# Patient Record
Sex: FEMALE | Race: WHITE | NOT HISPANIC OR LATINO | Employment: FULL TIME | URBAN - METROPOLITAN AREA
[De-identification: names, ages, dates, MRNs, and addresses within clinical notes are randomized per-mention and may not be internally consistent; named-entity substitution may affect disease eponyms.]

---

## 2024-01-08 ENCOUNTER — OFFICE VISIT (OUTPATIENT)
Dept: PRIMARY CARE | Facility: CLINIC | Age: 39
End: 2024-01-08
Payer: COMMERCIAL

## 2024-01-08 VITALS
RESPIRATION RATE: 15 BRPM | HEART RATE: 98 BPM | TEMPERATURE: 98 F | HEIGHT: 67 IN | DIASTOLIC BLOOD PRESSURE: 74 MMHG | SYSTOLIC BLOOD PRESSURE: 122 MMHG | WEIGHT: 171.2 LBS | BODY MASS INDEX: 26.87 KG/M2 | OXYGEN SATURATION: 99 %

## 2024-01-08 DIAGNOSIS — Z00.00 ANNUAL PHYSICAL EXAM: Primary | ICD-10-CM

## 2024-01-08 DIAGNOSIS — F41.9 ANXIETY AND DEPRESSION: ICD-10-CM

## 2024-01-08 DIAGNOSIS — F32.A ANXIETY AND DEPRESSION: ICD-10-CM

## 2024-01-08 DIAGNOSIS — B36.0 TINEA VERSICOLOR: ICD-10-CM

## 2024-01-08 PROCEDURE — 1036F TOBACCO NON-USER: CPT | Performed by: FAMILY MEDICINE

## 2024-01-08 PROCEDURE — 99213 OFFICE O/P EST LOW 20 MIN: CPT | Performed by: FAMILY MEDICINE

## 2024-01-08 PROCEDURE — 99395 PREV VISIT EST AGE 18-39: CPT | Performed by: FAMILY MEDICINE

## 2024-01-08 RX ORDER — DULOXETIN HYDROCHLORIDE 60 MG/1
60 CAPSULE, DELAYED RELEASE ORAL DAILY
Qty: 90 CAPSULE | Refills: 3 | Status: SHIPPED | OUTPATIENT
Start: 2024-01-08

## 2024-01-08 RX ORDER — SELENIUM SULFIDE 2.5 MG/100ML
LOTION TOPICAL
Qty: 120 ML | Refills: 2 | Status: SHIPPED | OUTPATIENT
Start: 2024-01-08 | End: 2025-01-07

## 2024-01-08 RX ORDER — TIRZEPATIDE 10 MG/.5ML
10 INJECTION, SOLUTION SUBCUTANEOUS
Qty: 2 ML | Refills: 0
Start: 2024-01-08 | End: 2024-02-07

## 2024-01-08 RX ORDER — DULOXETIN HYDROCHLORIDE 60 MG/1
60 CAPSULE, DELAYED RELEASE ORAL DAILY
COMMUNITY
End: 2024-01-08 | Stop reason: SDUPTHER

## 2024-01-08 ASSESSMENT — ANXIETY QUESTIONNAIRES
4. TROUBLE RELAXING: NOT AT ALL
2. NOT BEING ABLE TO STOP OR CONTROL WORRYING: SEVERAL DAYS
5. BEING SO RESTLESS THAT IT IS HARD TO SIT STILL: SEVERAL DAYS
IF YOU CHECKED OFF ANY PROBLEMS ON THIS QUESTIONNAIRE, HOW DIFFICULT HAVE THESE PROBLEMS MADE IT FOR YOU TO DO YOUR WORK, TAKE CARE OF THINGS AT HOME, OR GET ALONG WITH OTHER PEOPLE: NOT DIFFICULT AT ALL
1. FEELING NERVOUS, ANXIOUS, OR ON EDGE: NOT AT ALL
GAD7 TOTAL SCORE: 3
6. BECOMING EASILY ANNOYED OR IRRITABLE: NOT AT ALL
7. FEELING AFRAID AS IF SOMETHING AWFUL MIGHT HAPPEN: NOT AT ALL
3. WORRYING TOO MUCH ABOUT DIFFERENT THINGS: SEVERAL DAYS

## 2024-01-08 ASSESSMENT — PATIENT HEALTH QUESTIONNAIRE - PHQ9
6. FEELING BAD ABOUT YOURSELF - OR THAT YOU ARE A FAILURE OR HAVE LET YOURSELF OR YOUR FAMILY DOWN: NOT AT ALL
1. LITTLE INTEREST OR PLEASURE IN DOING THINGS: NOT AT ALL
9. THOUGHTS THAT YOU WOULD BE BETTER OFF DEAD, OR OF HURTING YOURSELF: NOT AT ALL
7. TROUBLE CONCENTRATING ON THINGS, SUCH AS READING THE NEWSPAPER OR WATCHING TELEVISION: NOT AT ALL
10. IF YOU CHECKED OFF ANY PROBLEMS, HOW DIFFICULT HAVE THESE PROBLEMS MADE IT FOR YOU TO DO YOUR WORK, TAKE CARE OF THINGS AT HOME, OR GET ALONG WITH OTHER PEOPLE: NOT DIFFICULT AT ALL
5. POOR APPETITE OR OVEREATING: NOT AT ALL
8. MOVING OR SPEAKING SO SLOWLY THAT OTHER PEOPLE COULD HAVE NOTICED. OR THE OPPOSITE, BEING SO FIGETY OR RESTLESS THAT YOU HAVE BEEN MOVING AROUND A LOT MORE THAN USUAL: NOT AT ALL
SUM OF ALL RESPONSES TO PHQ9 QUESTIONS 1 AND 2: 0
2. FEELING DOWN, DEPRESSED OR HOPELESS: NOT AT ALL
4. FEELING TIRED OR HAVING LITTLE ENERGY: SEVERAL DAYS
SUM OF ALL RESPONSES TO PHQ QUESTIONS 1-9: 1
3. TROUBLE FALLING OR STAYING ASLEEP OR SLEEPING TOO MUCH: NOT AT ALL

## 2024-01-08 NOTE — PROGRESS NOTES
"Subjective   Patient ID: Edison Horan is a 38 y.o. female who presents for Annual Exam.    Here for CPE    Taking cymbalta and helps anxiety and depression  Stable on meds  Sexual side effects.    Sleeping well  Diet is good  Taking GLP1 semaglutide through compounding pharm.   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   Constitutional:       Appearance: Normal appearance.   HENT:      Head: Normocephalic.      Right Ear: Tympanic membrane normal.      Left Ear: Tympanic membrane normal.      Nose: Nose normal.      Mouth/Throat:      Mouth: Mucous membranes are dry.   Eyes:      Extraocular Movements: Extraocular movements intact.      Pupils: Pupils are equal, round, and reactive to light.   Cardiovascular:      Rate and Rhythm: Normal rate and regular rhythm.      Pulses: Normal pulses.   Pulmonary:      Effort: Pulmonary effort is normal.      Breath sounds: Normal breath sounds.   Abdominal:      General: Abdomen is flat. Bowel sounds are normal.      Palpations: Abdomen is soft.   Musculoskeletal:         General: Normal range of motion.      Cervical back: Normal range of motion.   Skin:     General: Skin is warm and dry.   Neurological:      General: No focal deficit present.      Mental Status: She is alert and oriented to person, place, and time.   Psychiatric:         Mood and Affect: Mood normal.         Behavior: Behavior normal.         Thought Content: Thought content normal.         Judgment: Judgment normal.       Assessment/Plan   Problem List Items Addressed This Visit  "            ICD-10-CM    Annual physical exam - Primary Z00.00     Schedule fasting labs  Increase physical activity  Continue diet changes  Recheck in 1 year         Relevant Medications    tirzepatide (Mounjaro) 10 mg/0.5 mL pen injector    Other Relevant Orders    CBC and Auto Differential    Comprehensive Metabolic Panel    Lipid Panel    Tinea versicolor B36.0     Trial of selenium sulfide soln  Apply once daily for 10-14 days  Return if not improving.         Relevant Medications    selenium sulfide (Selsun) 2.5 % shampoo    Anxiety and depression F41.9, F32.A     Stable  Refilling meds at the same dose  Recheck in 1 year         Relevant Medications    DULoxetine (Cymbalta) 60 mg DR capsule

## 2024-01-22 ENCOUNTER — LAB (OUTPATIENT)
Dept: LAB | Facility: LAB | Age: 39
End: 2024-01-22
Payer: COMMERCIAL

## 2024-01-22 DIAGNOSIS — Z00.00 ANNUAL PHYSICAL EXAM: ICD-10-CM

## 2024-01-22 LAB
ALBUMIN SERPL BCP-MCNC: 4.1 G/DL (ref 3.4–5)
ALP SERPL-CCNC: 47 U/L (ref 33–110)
ALT SERPL W P-5'-P-CCNC: 15 U/L (ref 7–45)
ANION GAP SERPL CALC-SCNC: 12 MMOL/L (ref 10–20)
AST SERPL W P-5'-P-CCNC: 14 U/L (ref 9–39)
BASOPHILS # BLD AUTO: 0.04 X10*3/UL (ref 0–0.1)
BASOPHILS NFR BLD AUTO: 0.9 %
BILIRUB SERPL-MCNC: 0.9 MG/DL (ref 0–1.2)
BUN SERPL-MCNC: 8 MG/DL (ref 6–23)
CALCIUM SERPL-MCNC: 9.2 MG/DL (ref 8.6–10.6)
CHLORIDE SERPL-SCNC: 105 MMOL/L (ref 98–107)
CHOLEST SERPL-MCNC: 172 MG/DL (ref 0–199)
CHOLESTEROL/HDL RATIO: 2.3
CO2 SERPL-SCNC: 26 MMOL/L (ref 21–32)
CREAT SERPL-MCNC: 0.66 MG/DL (ref 0.5–1.05)
EGFRCR SERPLBLD CKD-EPI 2021: >90 ML/MIN/1.73M*2
EOSINOPHIL # BLD AUTO: 0.09 X10*3/UL (ref 0–0.7)
EOSINOPHIL NFR BLD AUTO: 2.1 %
ERYTHROCYTE [DISTWIDTH] IN BLOOD BY AUTOMATED COUNT: 11.9 % (ref 11.5–14.5)
GLUCOSE SERPL-MCNC: 73 MG/DL (ref 74–99)
HCT VFR BLD AUTO: 40.4 % (ref 36–46)
HDLC SERPL-MCNC: 74.3 MG/DL
HGB BLD-MCNC: 12.5 G/DL (ref 12–16)
IMM GRANULOCYTES # BLD AUTO: 0.01 X10*3/UL (ref 0–0.7)
IMM GRANULOCYTES NFR BLD AUTO: 0.2 % (ref 0–0.9)
LDLC SERPL CALC-MCNC: 89 MG/DL
LYMPHOCYTES # BLD AUTO: 1.73 X10*3/UL (ref 1.2–4.8)
LYMPHOCYTES NFR BLD AUTO: 40.2 %
MCH RBC QN AUTO: 26.5 PG (ref 26–34)
MCHC RBC AUTO-ENTMCNC: 30.9 G/DL (ref 32–36)
MCV RBC AUTO: 86 FL (ref 80–100)
MONOCYTES # BLD AUTO: 0.23 X10*3/UL (ref 0.1–1)
MONOCYTES NFR BLD AUTO: 5.3 %
NEUTROPHILS # BLD AUTO: 2.2 X10*3/UL (ref 1.2–7.7)
NEUTROPHILS NFR BLD AUTO: 51.3 %
NON HDL CHOLESTEROL: 98 MG/DL (ref 0–149)
NRBC BLD-RTO: 0 /100 WBCS (ref 0–0)
PLATELET # BLD AUTO: 240 X10*3/UL (ref 150–450)
POTASSIUM SERPL-SCNC: 4 MMOL/L (ref 3.5–5.3)
PROT SERPL-MCNC: 6.7 G/DL (ref 6.4–8.2)
RBC # BLD AUTO: 4.71 X10*6/UL (ref 4–5.2)
SODIUM SERPL-SCNC: 139 MMOL/L (ref 136–145)
TRIGL SERPL-MCNC: 46 MG/DL (ref 0–149)
VLDL: 9 MG/DL (ref 0–40)
WBC # BLD AUTO: 4.3 X10*3/UL (ref 4.4–11.3)

## 2024-01-22 PROCEDURE — 80061 LIPID PANEL: CPT

## 2024-01-22 PROCEDURE — 85025 COMPLETE CBC W/AUTO DIFF WBC: CPT

## 2024-01-22 PROCEDURE — 80053 COMPREHEN METABOLIC PANEL: CPT

## 2024-01-22 PROCEDURE — 36415 COLL VENOUS BLD VENIPUNCTURE: CPT

## 2024-02-07 ENCOUNTER — APPOINTMENT (OUTPATIENT)
Dept: PRIMARY CARE | Facility: CLINIC | Age: 39
End: 2024-02-07
Payer: COMMERCIAL

## 2024-04-08 ENCOUNTER — APPOINTMENT (OUTPATIENT)
Dept: PRIMARY CARE | Facility: CLINIC | Age: 39
End: 2024-04-08
Payer: COMMERCIAL

## 2024-04-18 ENCOUNTER — OFFICE VISIT (OUTPATIENT)
Dept: PRIMARY CARE | Facility: CLINIC | Age: 39
End: 2024-04-18
Payer: COMMERCIAL

## 2024-04-18 ENCOUNTER — APPOINTMENT (OUTPATIENT)
Dept: PRIMARY CARE | Facility: CLINIC | Age: 39
End: 2024-04-18
Payer: COMMERCIAL

## 2024-04-18 ENCOUNTER — LAB (OUTPATIENT)
Dept: LAB | Facility: LAB | Age: 39
End: 2024-04-18
Payer: COMMERCIAL

## 2024-04-18 VITALS
RESPIRATION RATE: 16 BRPM | WEIGHT: 167 LBS | HEART RATE: 87 BPM | TEMPERATURE: 98 F | SYSTOLIC BLOOD PRESSURE: 118 MMHG | OXYGEN SATURATION: 98 % | DIASTOLIC BLOOD PRESSURE: 83 MMHG | HEIGHT: 67 IN | BODY MASS INDEX: 26.21 KG/M2

## 2024-04-18 DIAGNOSIS — R55 SYNCOPE, UNSPECIFIED SYNCOPE TYPE: ICD-10-CM

## 2024-04-18 DIAGNOSIS — R42 ORTHOSTATIC DIZZINESS: ICD-10-CM

## 2024-04-18 DIAGNOSIS — R42 ORTHOSTATIC DIZZINESS: Primary | ICD-10-CM

## 2024-04-18 LAB
ANION GAP SERPL CALC-SCNC: 12 MMOL/L (ref 10–20)
BUN SERPL-MCNC: 6 MG/DL (ref 6–23)
CALCIUM SERPL-MCNC: 9.5 MG/DL (ref 8.6–10.6)
CHLORIDE SERPL-SCNC: 106 MMOL/L (ref 98–107)
CO2 SERPL-SCNC: 27 MMOL/L (ref 21–32)
CREAT SERPL-MCNC: 0.56 MG/DL (ref 0.5–1.05)
EGFRCR SERPLBLD CKD-EPI 2021: >90 ML/MIN/1.73M*2
ERYTHROCYTE [DISTWIDTH] IN BLOOD BY AUTOMATED COUNT: 12.4 % (ref 11.5–14.5)
GLUCOSE SERPL-MCNC: 78 MG/DL (ref 74–99)
HCT VFR BLD AUTO: 41.6 % (ref 36–46)
HGB BLD-MCNC: 13 G/DL (ref 12–16)
MCH RBC QN AUTO: 26.9 PG (ref 26–34)
MCHC RBC AUTO-ENTMCNC: 31.3 G/DL (ref 32–36)
MCV RBC AUTO: 86 FL (ref 80–100)
NRBC BLD-RTO: 0 /100 WBCS (ref 0–0)
PLATELET # BLD AUTO: 239 X10*3/UL (ref 150–450)
POTASSIUM SERPL-SCNC: 4.5 MMOL/L (ref 3.5–5.3)
RBC # BLD AUTO: 4.83 X10*6/UL (ref 4–5.2)
SODIUM SERPL-SCNC: 140 MMOL/L (ref 136–145)
WBC # BLD AUTO: 4.5 X10*3/UL (ref 4.4–11.3)

## 2024-04-18 PROCEDURE — 85027 COMPLETE CBC AUTOMATED: CPT

## 2024-04-18 PROCEDURE — 36415 COLL VENOUS BLD VENIPUNCTURE: CPT

## 2024-04-18 PROCEDURE — 99214 OFFICE O/P EST MOD 30 MIN: CPT | Performed by: FAMILY MEDICINE

## 2024-04-18 PROCEDURE — 80048 BASIC METABOLIC PNL TOTAL CA: CPT

## 2024-04-18 NOTE — PROGRESS NOTES
"Subjective   Patient ID: Edison Horan is a 38 y.o. female who presents for Discuss fainting  (2x in 3 months).    HPI   PCP: Dr. Baker    Fainted twice in the past 4 months.   1st episode 4 months ago, occurred immediately after standing.  No changes in fluid or food intake prior.  2nd episode 3 wks ago, occurred immediately after standing.  Was fasting during Ramadan.  Episode witnessed by .  Duration < 1 min.  No incontinence or tonic-clonic activity.   Occ gets head de los santos when standing up too fast.  Drinks 35 oz water/day, 1 cup coffee/day, occ tea.  No pop, alcohol.     Review of Systems  No other complaints.     Objective   /83   Pulse 87   Temp 36.7 °C (98 °F)   Resp 16   Ht 1.689 m (5' 6.5\")   Wt 75.8 kg (167 lb)   SpO2 98%   BMI 26.55 kg/m²   Orthostatics: Laying: /64 HR 78, Sitting: /66 HR 86, Standing: /68 .    Physical Exam  Constitutional:       General: She is not in acute distress.     Appearance: She is overweight.   HENT:      Head: Normocephalic.      Right Ear: Tympanic membrane normal.      Left Ear: Tympanic membrane normal.      Mouth/Throat:      Pharynx: Oropharynx is clear. No oropharyngeal exudate or posterior oropharyngeal erythema.   Eyes:      Conjunctiva/sclera: Conjunctivae normal.      Pupils: Pupils are equal, round, and reactive to light.   Neck:      Thyroid: No thyromegaly.      Vascular: No carotid bruit.   Cardiovascular:      Rate and Rhythm: Normal rate and regular rhythm.      Heart sounds: Normal heart sounds. No murmur heard.     No friction rub. No gallop.   Pulmonary:      Effort: Pulmonary effort is normal.      Breath sounds: Normal breath sounds. No wheezing, rhonchi or rales.   Neurological:      Mental Status: She is oriented to person, place, and time.   Psychiatric:         Mood and Affect: Mood normal.         Behavior: Behavior normal.     Assessment/Plan   Diagnoses and all orders for this visit:  Orthostatic dizziness  - "     CBC; Future  -     Basic Metabolic Panel; Future  Syncope, unspecified syncope type  -     CBC; Future  -     Basic Metabolic Panel; Future    Nonfasting labs.  Discussed hypovolemia as possible etiology of orthostatic head rush.  Discussed fasting status and hypovolemia as etiology of prior syncopal episodes.  Recommend increasing water intake (64 oz water/day) and eliminating caffeine intake.  Recommend eating at regular intervals (3 meals/day and 2 between meal snacks/day).  Call, send message through [employer], or return to office prn if these symptoms worsen or fail to improve as anticipated.     F/U w/PCP per normal schedule.

## 2024-04-18 NOTE — PATIENT INSTRUCTIONS
Nonfasting labs.  Discussed hypovolemia as possible etiology of orthostatic head rush.  Discussed fasting status and hypovolemia as etiology of prior syncopal episodes.  Recommend increasing water intake (64 oz water/day) and eliminating caffeine intake.  Recommend eating at regular intervals (3 meals/day and 2 between meal snacks/day).  Call, send message through [employer], or return to office prn if these symptoms worsen or fail to improve as anticipated.     F/U w/PCP per normal schedule.

## 2024-08-15 ENCOUNTER — TELEPHONE (OUTPATIENT)
Dept: DERMATOLOGY | Facility: CLINIC | Age: 39
End: 2024-08-15
Payer: COMMERCIAL

## 2024-12-29 DIAGNOSIS — F32.A ANXIETY AND DEPRESSION: ICD-10-CM

## 2024-12-29 DIAGNOSIS — F41.9 ANXIETY AND DEPRESSION: ICD-10-CM

## 2024-12-30 DIAGNOSIS — F41.9 ANXIETY AND DEPRESSION: ICD-10-CM

## 2024-12-30 DIAGNOSIS — F32.A ANXIETY AND DEPRESSION: ICD-10-CM

## 2024-12-30 RX ORDER — DULOXETIN HYDROCHLORIDE 60 MG/1
60 CAPSULE, DELAYED RELEASE ORAL DAILY
Qty: 90 CAPSULE | Refills: 0 | Status: SHIPPED | OUTPATIENT
Start: 2024-12-30

## 2024-12-30 RX ORDER — DULOXETIN HYDROCHLORIDE 60 MG/1
60 CAPSULE, DELAYED RELEASE ORAL DAILY
Qty: 90 CAPSULE | Refills: 0 | Status: SHIPPED | OUTPATIENT
Start: 2024-12-30 | End: 2024-12-30 | Stop reason: SDUPTHER

## 2025-03-03 ENCOUNTER — APPOINTMENT (OUTPATIENT)
Dept: PRIMARY CARE | Facility: CLINIC | Age: 40
End: 2025-03-03
Payer: COMMERCIAL

## 2025-03-03 VITALS
WEIGHT: 167 LBS | HEIGHT: 67 IN | SYSTOLIC BLOOD PRESSURE: 108 MMHG | BODY MASS INDEX: 26.21 KG/M2 | DIASTOLIC BLOOD PRESSURE: 76 MMHG

## 2025-03-03 DIAGNOSIS — Z80.3 FAMILY HISTORY OF BREAST CANCER: ICD-10-CM

## 2025-03-03 DIAGNOSIS — E55.9 VITAMIN D DEFICIENCY: ICD-10-CM

## 2025-03-03 DIAGNOSIS — E66.3 OVERWEIGHT (BMI 25.0-29.9): ICD-10-CM

## 2025-03-03 DIAGNOSIS — Z00.00 ROUTINE GENERAL MEDICAL EXAMINATION AT A HEALTH CARE FACILITY: Primary | ICD-10-CM

## 2025-03-03 DIAGNOSIS — F32.A ANXIETY AND DEPRESSION: ICD-10-CM

## 2025-03-03 DIAGNOSIS — F41.9 ANXIETY AND DEPRESSION: ICD-10-CM

## 2025-03-03 DIAGNOSIS — R00.2 PALPITATIONS: ICD-10-CM

## 2025-03-03 DIAGNOSIS — Z98.84 S/P GASTRIC BYPASS: ICD-10-CM

## 2025-03-03 PROBLEM — R76.8 ELEVATED ANTI-TISSUE TRANSGLUTAMINASE (TTG) IGA LEVEL: Status: ACTIVE | Noted: 2025-03-03

## 2025-03-03 PROBLEM — L30.9 ECZEMA: Status: ACTIVE | Noted: 2025-03-03

## 2025-03-03 PROBLEM — K80.20 CHOLELITHIASIS: Status: ACTIVE | Noted: 2025-03-03

## 2025-03-03 PROBLEM — J30.9 ALLERGIC RHINITIS: Status: RESOLVED | Noted: 2025-03-03 | Resolved: 2025-03-03

## 2025-03-03 PROBLEM — Z97.5 DISORDER OF INTRAUTERINE CONTRACEPTIVE DEVICE (CMS-HCC): Status: ACTIVE | Noted: 2025-03-03

## 2025-03-03 PROBLEM — B36.0 TINEA VERSICOLOR: Status: RESOLVED | Noted: 2024-01-08 | Resolved: 2025-03-03

## 2025-03-03 PROBLEM — H92.09 REFERRED OTALGIA: Status: RESOLVED | Noted: 2019-11-06 | Resolved: 2025-03-03

## 2025-03-03 PROBLEM — D64.9 ANEMIA: Status: RESOLVED | Noted: 2025-03-03 | Resolved: 2025-03-03

## 2025-03-03 PROBLEM — M25.562 ARTHRALGIA OF BOTH KNEES: Status: ACTIVE | Noted: 2025-03-03

## 2025-03-03 PROBLEM — R76.11 POSITIVE TB TEST: Status: ACTIVE | Noted: 2025-03-03

## 2025-03-03 PROBLEM — H60.549 ECZEMA OF EXTERNAL AUDITORY CANAL: Status: ACTIVE | Noted: 2019-11-06

## 2025-03-03 PROBLEM — R63.4 RAPID WEIGHT LOSS: Status: RESOLVED | Noted: 2025-03-03 | Resolved: 2025-03-03

## 2025-03-03 PROBLEM — R53.83 FATIGUE: Status: ACTIVE | Noted: 2025-03-03

## 2025-03-03 PROBLEM — R10.812 LEFT UPPER QUADRANT ABDOMINAL TENDERNESS: Status: RESOLVED | Noted: 2025-03-03 | Resolved: 2025-03-03

## 2025-03-03 PROBLEM — H69.91 DYSFUNCTION OF RIGHT EUSTACHIAN TUBE: Status: RESOLVED | Noted: 2019-11-06 | Resolved: 2025-03-03

## 2025-03-03 PROBLEM — T83.9XXA DISORDER OF INTRAUTERINE CONTRACEPTIVE DEVICE (CMS-HCC): Status: ACTIVE | Noted: 2025-03-03

## 2025-03-03 PROBLEM — H60.509 OTITIS EXTERNA, ACUTE: Status: RESOLVED | Noted: 2025-03-03 | Resolved: 2025-03-03

## 2025-03-03 PROBLEM — M79.7 FIBROMYALGIA: Status: ACTIVE | Noted: 2025-03-03

## 2025-03-03 PROBLEM — N63.11 BREAST LUMP ON RIGHT SIDE AT 11 O'CLOCK POSITION: Status: ACTIVE | Noted: 2025-03-03

## 2025-03-03 PROBLEM — M26.629 TEMPOROMANDIBULAR JOINT-PAIN-DYSFUNCTION SYNDROME: Status: ACTIVE | Noted: 2019-11-06

## 2025-03-03 PROBLEM — K90.9 MALABSORPTION (HHS-HCC): Status: RESOLVED | Noted: 2025-03-03 | Resolved: 2025-03-03

## 2025-03-03 PROBLEM — R59.9 LYMPH NODES ENLARGED: Status: RESOLVED | Noted: 2025-03-03 | Resolved: 2025-03-03

## 2025-03-03 PROBLEM — H61.23 BILATERAL IMPACTED CERUMEN: Status: RESOLVED | Noted: 2025-03-03 | Resolved: 2025-03-03

## 2025-03-03 PROBLEM — K21.9 GERD (GASTROESOPHAGEAL REFLUX DISEASE): Status: ACTIVE | Noted: 2025-03-03

## 2025-03-03 PROBLEM — M25.561 ARTHRALGIA OF BOTH KNEES: Status: ACTIVE | Noted: 2025-03-03

## 2025-03-03 PROBLEM — L68.0 HIRSUTISM: Status: ACTIVE | Noted: 2025-03-03

## 2025-03-03 PROBLEM — M70.70 BURSITIS OF HIP: Status: RESOLVED | Noted: 2025-03-03 | Resolved: 2025-03-03

## 2025-03-03 PROBLEM — J02.9 SORETHROAT: Status: RESOLVED | Noted: 2025-03-03 | Resolved: 2025-03-03

## 2025-03-03 PROBLEM — F33.0 DEPRESSION, MAJOR, RECURRENT, MILD (CMS-HCC): Status: ACTIVE | Noted: 2025-03-03

## 2025-03-03 PROCEDURE — 99395 PREV VISIT EST AGE 18-39: CPT | Performed by: INTERNAL MEDICINE

## 2025-03-03 PROCEDURE — 1036F TOBACCO NON-USER: CPT | Performed by: INTERNAL MEDICINE

## 2025-03-03 PROCEDURE — 3008F BODY MASS INDEX DOCD: CPT | Performed by: INTERNAL MEDICINE

## 2025-03-03 RX ORDER — BISMUTH SUBSALICYLATE 262 MG
1 TABLET,CHEWABLE ORAL DAILY
COMMUNITY

## 2025-03-03 RX ORDER — DULOXETIN HYDROCHLORIDE 60 MG/1
60 CAPSULE, DELAYED RELEASE ORAL DAILY
Qty: 90 CAPSULE | Refills: 3 | Status: SHIPPED | OUTPATIENT
Start: 2025-03-03

## 2025-03-03 RX ORDER — ERGOCALCIFEROL 1.25 MG/1
CAPSULE ORAL
COMMUNITY
End: 2025-03-03 | Stop reason: ALTCHOICE

## 2025-03-03 RX ORDER — FERROUS SULFATE 325(65) MG
325 TABLET ORAL
COMMUNITY

## 2025-03-03 ASSESSMENT — PATIENT HEALTH QUESTIONNAIRE - PHQ9
SUM OF ALL RESPONSES TO PHQ9 QUESTIONS 1 AND 2: 0
1. LITTLE INTEREST OR PLEASURE IN DOING THINGS: NOT AT ALL
2. FEELING DOWN, DEPRESSED OR HOPELESS: NOT AT ALL

## 2025-03-03 NOTE — PROGRESS NOTES
"Subjective   Patient ID: Edison Horan is a 39 y.o. female who presents for Annual Exam.    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  She is due for an annual wellness exam and lab work.  Patient is scheduled to see gynecology regarding Pap/pelvic.  She does have family history of breast cancer in her maternal aunt and did have a mammogram done in May 2019 which was okay.  I have advised patient to discuss with gynecology regarding getting a repeat mammogram if she is concerned about this due to family history.  Patient lives with her  and young kids.  She is getting semaglutide through a compounding pharmacy to keep her weight down.  She is status post gastric bypass and claims that the duloxetine she takes for anxiety/depression has caused some weight gain.  Mood is stable on current dose of duloxetine for which patient request refills.  No history of fever, chills, chest pain, shortness of breath, cough, dizziness, syncope, abdominal pain, nausea, vomiting, diarrhea, melena, rectal bleeding, dysuria, hematuria, headaches, weakness, numbness, mood or sleep issues reported.  No vision or hearing issues reported.  Review of Systems  Patient has an IUD in place and she does get some spotting on a monthly basis.  Patient has rare episodes of palpitations which resolved with Valsalva maneuver but she has no other associated cardiac symptoms.  Objective   /76 (BP Location: Right arm, Patient Position: Sitting, BP Cuff Size: Large adult)   Ht 1.689 m (5' 6.5\")   Wt 75.8 kg (167 lb)   BMI 26.55 kg/m²     Physical Exam  General - well developed, well appearing, slightly overweight, young female in no acute respiratory distress  Eyes - normal conjunctiva with no pallor or icterus, normal extraocular movements  ENT - normal external auditory canals and tympanic membranes, throat clear with no exudates  Neck - No JVD, thyromegaly or " lymphadenopathy  Lungs - no respiratory distress and lungs clear to auscultation bilaterally with no rales or wheezes  Heart - normal S1, S2 with normal heart rate, rhythm and no murmurs   Breasts, pelvic and pap - per gyn  Abdomen -  soft, nontender with no masses or organomegaly  Extremities - no cyanosis or pedal edema  Neuro - grossly normal neuro exam with no focal neuro deficits  Psych - normal mental status, mood and affect   Skin - no rashes or ulcers  MSK - normal gait with grossly normal ROM of major joints  Assessment/Plan     1.  Annual wellness exam-Pap/pelvic/mammogram per gynecology, routine labs will be ordered  2.  Anxiety and depression-stable, refill provided for duloxetine  3.  Vitamin D deficiency-vitamin D 25-hydroxy level will be checked  4.  Status post gastric bypass, overweight with a BMI of over 25-patient is getting semaglutide through a compounding pharmacy and this is keeping her weight stable  5.  Palpitations, rare-monitor for now, TSH will be ordered  6.  Family history of breast cancer-patient has been advised to check with gynecology regarding getting mammogram done now since the last one was done in 2019  Follow-up in 1 year or sooner if needed.  This note was partially generated using the Dragon voice recognition system. There may be some incorrect words, spelling and punctuation errors that were not corrected prior to committing the note to the patient's medical record.

## 2025-03-04 DIAGNOSIS — E55.9 VITAMIN D DEFICIENCY: Primary | ICD-10-CM

## 2025-03-04 LAB
25(OH)D3+25(OH)D2 SERPL-MCNC: 14 NG/ML (ref 30–100)
ALBUMIN SERPL-MCNC: 4.7 G/DL (ref 3.6–5.1)
ALP SERPL-CCNC: 52 U/L (ref 31–125)
ALT SERPL-CCNC: 16 U/L (ref 6–29)
ANION GAP SERPL CALCULATED.4IONS-SCNC: 10 MMOL/L (CALC) (ref 7–17)
APPEARANCE UR: ABNORMAL
AST SERPL-CCNC: 17 U/L (ref 10–30)
BACTERIA #/AREA URNS HPF: ABNORMAL /HPF
BILIRUB SERPL-MCNC: 0.5 MG/DL (ref 0.2–1.2)
BILIRUB UR QL STRIP: NEGATIVE
BUN SERPL-MCNC: 13 MG/DL (ref 7–25)
CALCIUM SERPL-MCNC: 9.5 MG/DL (ref 8.6–10.2)
CHLORIDE SERPL-SCNC: 102 MMOL/L (ref 98–110)
CHOLEST SERPL-MCNC: 215 MG/DL
CHOLEST/HDLC SERPL: 2.5 (CALC)
CO2 SERPL-SCNC: 25 MMOL/L (ref 20–32)
COLOR UR: ABNORMAL
CREAT SERPL-MCNC: 0.57 MG/DL (ref 0.5–0.97)
EGFRCR SERPLBLD CKD-EPI 2021: 120 ML/MIN/1.73M2
ERYTHROCYTE [DISTWIDTH] IN BLOOD BY AUTOMATED COUNT: 11.7 % (ref 11–15)
EST. AVERAGE GLUCOSE BLD GHB EST-MCNC: 97 MG/DL
EST. AVERAGE GLUCOSE BLD GHB EST-SCNC: 5.4 MMOL/L
GLUCOSE SERPL-MCNC: 73 MG/DL (ref 65–99)
GLUCOSE UR QL STRIP: NEGATIVE
HBA1C MFR BLD: 5 % OF TOTAL HGB
HCT VFR BLD AUTO: 40.3 % (ref 35–45)
HDLC SERPL-MCNC: 85 MG/DL
HGB BLD-MCNC: 12.8 G/DL (ref 11.7–15.5)
HGB UR QL STRIP: ABNORMAL
HYALINE CASTS #/AREA URNS LPF: ABNORMAL /LPF
KETONES UR QL STRIP: NEGATIVE
LDLC SERPL CALC-MCNC: 116 MG/DL (CALC)
LEUKOCYTE ESTERASE UR QL STRIP: ABNORMAL
MCH RBC QN AUTO: 26.9 PG (ref 27–33)
MCHC RBC AUTO-ENTMCNC: 31.8 G/DL (ref 32–36)
MCV RBC AUTO: 84.7 FL (ref 80–100)
NITRITE UR QL STRIP: NEGATIVE
NONHDLC SERPL-MCNC: 130 MG/DL (CALC)
PH UR STRIP: 5.5 [PH] (ref 5–8)
PLATELET # BLD AUTO: 304 THOUSAND/UL (ref 140–400)
PMV BLD REES-ECKER: 11.5 FL (ref 7.5–12.5)
POTASSIUM SERPL-SCNC: 4.4 MMOL/L (ref 3.5–5.3)
PROT SERPL-MCNC: 7.6 G/DL (ref 6.1–8.1)
PROT UR QL STRIP: ABNORMAL
RBC # BLD AUTO: 4.76 MILLION/UL (ref 3.8–5.1)
RBC #/AREA URNS HPF: ABNORMAL /HPF
SERVICE CMNT-IMP: ABNORMAL
SODIUM SERPL-SCNC: 137 MMOL/L (ref 135–146)
SP GR UR STRIP: 1.03 (ref 1–1.03)
SQUAMOUS #/AREA URNS HPF: ABNORMAL /HPF
TRIGL SERPL-MCNC: 45 MG/DL
TSH SERPL-ACNC: 0.9 MIU/L
WBC # BLD AUTO: 5.9 THOUSAND/UL (ref 3.8–10.8)
WBC #/AREA URNS HPF: ABNORMAL /HPF

## 2025-03-04 RX ORDER — ASPIRIN 325 MG
50000 TABLET, DELAYED RELEASE (ENTERIC COATED) ORAL WEEKLY
Qty: 12 CAPSULE | Refills: 1 | Status: SHIPPED | OUTPATIENT
Start: 2025-03-04 | End: 2026-03-04

## 2025-04-22 ENCOUNTER — OFFICE VISIT (OUTPATIENT)
Dept: OBSTETRICS AND GYNECOLOGY | Facility: CLINIC | Age: 40
End: 2025-04-22
Payer: COMMERCIAL

## 2025-04-22 VITALS
HEART RATE: 82 BPM | DIASTOLIC BLOOD PRESSURE: 74 MMHG | WEIGHT: 166 LBS | BODY MASS INDEX: 26.39 KG/M2 | SYSTOLIC BLOOD PRESSURE: 112 MMHG

## 2025-04-22 DIAGNOSIS — Z80.3 FAMILY HISTORY OF BREAST CANCER: ICD-10-CM

## 2025-04-22 DIAGNOSIS — Z30.432 ENCOUNTER FOR IUD REMOVAL: ICD-10-CM

## 2025-04-22 DIAGNOSIS — Z01.419 WELL WOMAN EXAM WITH ROUTINE GYNECOLOGICAL EXAM: Primary | ICD-10-CM

## 2025-04-22 DIAGNOSIS — R68.82 LOW LIBIDO: ICD-10-CM

## 2025-04-22 PROCEDURE — 58301 REMOVE INTRAUTERINE DEVICE: CPT | Performed by: NURSE PRACTITIONER

## 2025-04-22 PROCEDURE — 99395 PREV VISIT EST AGE 18-39: CPT | Performed by: NURSE PRACTITIONER

## 2025-04-22 ASSESSMENT — ENCOUNTER SYMPTOMS
MUSCULOSKELETAL NEGATIVE: 0
ENDOCRINE NEGATIVE: 0
PSYCHIATRIC NEGATIVE: 0
NEUROLOGICAL NEGATIVE: 0
GASTROINTESTINAL NEGATIVE: 0
EYES NEGATIVE: 0
RESPIRATORY NEGATIVE: 0
ALLERGIC/IMMUNOLOGIC NEGATIVE: 0
CARDIOVASCULAR NEGATIVE: 0
CONSTITUTIONAL NEGATIVE: 0
HEMATOLOGIC/LYMPHATIC NEGATIVE: 0

## 2025-04-22 ASSESSMENT — PAIN SCALES - GENERAL: PAINLEVEL_OUTOF10: 0-NO PAIN

## 2025-04-22 ASSESSMENT — PATIENT HEALTH QUESTIONNAIRE - PHQ9
2. FEELING DOWN, DEPRESSED OR HOPELESS: NOT AT ALL
1. LITTLE INTEREST OR PLEASURE IN DOING THINGS: NOT AT ALL
SUM OF ALL RESPONSES TO PHQ9 QUESTIONS 1 AND 2: 0

## 2025-04-22 NOTE — PROGRESS NOTES
Arlin Breaux, APRN-CNP     Subjective   Edison Horan is a 39 y.o. female who presents for annual exam.   39-year-old female here today for annual exam.    Patient has 3 concerns today including breakthrough bleeding with her Mirena IUD.  She would like to have the IUD removed.  IUD used for both birth control and to decrease her heavy menstrual periods.    Patient also asking about beginning mammogram as she had a maternal aunt with breast cancer at the age of 50.  That aunt also had ovarian cancer.    Thirdly patient reports being emotionally stable on Cymbalta but has decreased sexual drive.  She is asking for help with that and she is aware of the medication called Addyi.  We did discuss its use and possibility for hypotension if mixed with alcohol.  Patient does not drink and this is not a concern for her.  Medical History[1]  Surgical History[2]    OB History          2    Para   2    Term   2            AB        Living   1         SAB        IAB        Ectopic        Multiple        Live Births   1               Patient's last menstrual period was 04/10/2025 (approximate).      Review of Systems  Breast: No Complaints   Vaginal: Unexplained Bleeding        Objective   /74 (BP Location: Left arm, Patient Position: Sitting, BP Cuff Size: Adult)   Pulse 82   Wt 75.3 kg (166 lb)   LMP 04/10/2025 (Approximate)   BMI 26.39 kg/m²   Physical Exam  Constitutional:       Appearance: She is normal weight.   Genitourinary:      Urethral meatus normal.      Genitourinary Comments: Scaring from past surgical procedures      Right Labia: No rash.     Left Labia: No rash.     Vaginal discharge present.      No vaginal prolapse present.     No vaginal atrophy present.       Right Adnexa: no mass present.     Left Adnexa: no mass present.     No cervical motion tenderness.      IUD strings visualized.      Uterus is not enlarged.   Breasts:     Right: Skin change present.      Left: Skin change  present.   Cardiovascular:      Rate and Rhythm: Normal rate.   Pulmonary:      Effort: Pulmonary effort is normal.      Breath sounds: Normal breath sounds.   Abdominal:      General: Abdomen is flat.      Palpations: Abdomen is soft.   Musculoskeletal:      Cervical back: Normal range of motion.   Neurological:      Mental Status: She is alert.   Skin:     General: Skin is warm and dry.   Psychiatric:         Mood and Affect: Mood normal.         Behavior: Behavior normal.           Patient ID: Edison Horan is a 39 y.o. female.    IUD Management    Performed by: MATEUS Lopez  Authorized by: MATEUS Lopez    Procedure: IUD removal    Consent obtained by patient, parent, or legal power of  - including discussion of procedure risks and benefits, patient questions answered, and patient education provided: yes    Reason for removal: patient request    Strings visualized: yes    Tenaculum applied to cervix: no    Cervix manually dilated: no    IUD grasped by forceps: yes    Performed with ultrasound guidance: no    IUD removed: yes    Date/Time of Removal:  4/22/2025 1:30 PM  Removed without complications: yes    IUD intact: yes        Assessment/Plan   Problem List Items Addressed This Visit    None  Visit Diagnoses         Codes      Well woman exam with routine gynecological exam    -  Primary Z01.419    Relevant Orders    THINPREP PAP TEST (>30)      Family history of breast cancer     Z80.3    Relevant Orders    BI mammo bilateral diagnostic tomosynthesis      Low libido     R68.82    Relevant Medications    flibanserin 100 mg tablet      Encounter for IUD removal     Z30.432        Patient will regarding the use of the Flibanserin        [1]   Past Medical History:  Diagnosis Date    Allergic rhinitis 03/03/2025    Anemia 03/03/2025    Anxiety     Bilateral impacted cerumen 03/03/2025    Bursitis of hip 03/03/2025    Cough, unspecified 12/28/2015    Cough    Depression      Encounter for screening for malignant neoplasm of cervix 10/31/2014    Screening for cervical cancer    Left upper quadrant abdominal tenderness 2025    Otitis externa, acute 2025    Rapid weight loss 2025    Referred otalgia 2019    Sorethroat 2025   [2]   Past Surgical History:  Procedure Laterality Date    ADENOIDECTOMY      BARIATRIC SURGERY       SECTION, CLASSIC  10/31/2014     Section     SECTION, LOW TRANSVERSE      CHOLECYSTECTOMY      COSMETIC SURGERY      OTHER SURGICAL HISTORY  2020    Cholecystectomy    OTHER SURGICAL HISTORY  2020    Gastric bypass surgery    AK BREAST AUGMENTATION WITH IMPLANT

## 2025-04-29 ENCOUNTER — HOSPITAL ENCOUNTER (OUTPATIENT)
Dept: RADIOLOGY | Facility: CLINIC | Age: 40
Discharge: HOME | End: 2025-04-29
Payer: COMMERCIAL

## 2025-04-29 VITALS — BODY MASS INDEX: 26.68 KG/M2 | WEIGHT: 166.01 LBS | HEIGHT: 66 IN

## 2025-04-29 DIAGNOSIS — Z80.3 FAMILY HISTORY OF BREAST CANCER: ICD-10-CM

## 2025-04-29 PROCEDURE — 77067 SCR MAMMO BI INCL CAD: CPT

## 2025-04-29 PROCEDURE — 77067 SCR MAMMO BI INCL CAD: CPT | Performed by: RADIOLOGY

## 2025-04-29 PROCEDURE — 77063 BREAST TOMOSYNTHESIS BI: CPT | Performed by: RADIOLOGY

## 2025-05-08 LAB
CYTOLOGY CMNT CVX/VAG CYTO-IMP: NORMAL
HPV HR 12 DNA GENITAL QL NAA+PROBE: NEGATIVE
HPV HR GENOTYPES PNL CVX NAA+PROBE: NEGATIVE
HPV16 DNA SPEC QL NAA+PROBE: NEGATIVE
HPV18 DNA SPEC QL NAA+PROBE: NEGATIVE
LAB AP CONTRACEPTIVE HISTORY: NORMAL
LAB AP HPV GENOTYPE QUESTION: YES
LAB AP HPV HR: NORMAL
LABORATORY COMMENT REPORT: NORMAL
LMP START DATE: NORMAL
PATH REPORT.TOTAL CANCER: NORMAL

## 2025-08-17 DIAGNOSIS — E55.9 VITAMIN D DEFICIENCY: ICD-10-CM

## 2025-08-18 RX ORDER — ASPIRIN 325 MG
1.25 TABLET, DELAYED RELEASE (ENTERIC COATED) ORAL
Qty: 12 CAPSULE | Refills: 1 | OUTPATIENT
Start: 2025-08-18

## 2026-03-03 ENCOUNTER — APPOINTMENT (OUTPATIENT)
Dept: PRIMARY CARE | Facility: CLINIC | Age: 41
End: 2026-03-03
Payer: COMMERCIAL